# Patient Record
Sex: FEMALE | Race: WHITE | HISPANIC OR LATINO | URBAN - METROPOLITAN AREA
[De-identification: names, ages, dates, MRNs, and addresses within clinical notes are randomized per-mention and may not be internally consistent; named-entity substitution may affect disease eponyms.]

---

## 2019-03-12 ENCOUNTER — OUTPATIENT (OUTPATIENT)
Dept: OUTPATIENT SERVICES | Facility: HOSPITAL | Age: 29
LOS: 1 days | Discharge: HOME | End: 2019-03-12

## 2019-03-12 DIAGNOSIS — K58.1 IRRITABLE BOWEL SYNDROME WITH CONSTIPATION: ICD-10-CM

## 2019-03-12 DIAGNOSIS — L70.9 ACNE, UNSPECIFIED: ICD-10-CM

## 2019-03-12 DIAGNOSIS — M54.5 LOW BACK PAIN: ICD-10-CM

## 2019-03-21 ENCOUNTER — OUTPATIENT (OUTPATIENT)
Dept: OUTPATIENT SERVICES | Facility: HOSPITAL | Age: 29
LOS: 1 days | Discharge: HOME | End: 2019-03-21

## 2019-03-21 DIAGNOSIS — M54.5 LOW BACK PAIN: ICD-10-CM

## 2019-03-21 DIAGNOSIS — K58.1 IRRITABLE BOWEL SYNDROME WITH CONSTIPATION: ICD-10-CM

## 2019-03-21 DIAGNOSIS — L70.9 ACNE, UNSPECIFIED: ICD-10-CM

## 2023-05-17 PROBLEM — Z00.00 ENCOUNTER FOR PREVENTIVE HEALTH EXAMINATION: Status: ACTIVE | Noted: 2023-05-17

## 2023-05-18 ENCOUNTER — APPOINTMENT (OUTPATIENT)
Dept: RHEUMATOLOGY | Facility: CLINIC | Age: 33
End: 2023-05-18
Payer: MEDICAID

## 2023-05-18 VITALS
BODY MASS INDEX: 25.95 KG/M2 | OXYGEN SATURATION: 98 % | HEIGHT: 64 IN | WEIGHT: 152 LBS | SYSTOLIC BLOOD PRESSURE: 116 MMHG | DIASTOLIC BLOOD PRESSURE: 80 MMHG | HEART RATE: 53 BPM

## 2023-05-18 DIAGNOSIS — Z78.9 OTHER SPECIFIED HEALTH STATUS: ICD-10-CM

## 2023-05-18 DIAGNOSIS — R76.8 OTHER SPECIFIED ABNORMAL IMMUNOLOGICAL FINDINGS IN SERUM: ICD-10-CM

## 2023-05-18 PROCEDURE — 99204 OFFICE O/P NEW MOD 45 MIN: CPT

## 2023-05-18 RX ORDER — CYCLOBENZAPRINE HYDROCHLORIDE 5 MG/1
5 TABLET, FILM COATED ORAL 3 TIMES DAILY
Qty: 30 | Refills: 0 | Status: ACTIVE | COMMUNITY
Start: 2023-05-18 | End: 1900-01-01

## 2023-05-18 RX ORDER — ARNICA MONTANA 1 [HP_X]/G
GEL TOPICAL
Qty: 1 | Refills: 0 | Status: ACTIVE | COMMUNITY
Start: 2023-05-18 | End: 1900-01-01

## 2023-05-18 NOTE — HISTORY OF PRESENT ILLNESS
[FreeTextEntry1] : She reports symptoms of fatigue for the past 1 month that lasts all day she is still going to work and the gym but feels more tired. Sleeps 2-3 hours after going home from work at 6-7 pm and then sleeps 6-7 hours in the night. Denies night sweats, weight loss, skin rash, mucositis, photosensitivity although it makes her more tired and irritates her eyes, raynauds, dysphagia, skin thickening, dry mouth, blood clots, miscarriages, serositis, +dry eyes from allergies? + hair loss for the past 6 months\par \par She reports symptoms of L shoulder pain 3 weeks ago. Initially felt this working out at the gym during a bench press. Now her pain is intermittent, daily, worse at the end of the day when she goes home but has this pain throughout the day. Ibuprofen 600 mg, and pain relief spray over the counter helps. During work at [employer] she has no pain. Reports mid neck pain that started at the same time. Denies radiculopathy. When sitting in the same position for a long time she has tingling in her legs and arms. \par \par \par \par \par \par Denies family history of autoimmune disease\par \par \par

## 2023-05-18 NOTE — ASSESSMENT
[FreeTextEntry1] : Positive ELIZABETH and fatigue\par -No clear signs or symptoms to suggest an underlying autoimmune disease\par -check ELIZABETH, dsDNA, Cedillo, RNP, C3, C4, iron, TIBC, folate, B12, B6, vitamin D, TSH, IgG, IgM, IgA, ESR, CRP\par \par Left shoulder pain\par -Suspect due to muscle strain as her symptoms are over her L trapezius muscle and shoulder exam was normal\par -Start cyclobenzaprine 5 mg TID PRN and use arnica get

## 2023-05-18 NOTE — PHYSICAL EXAM
[General Appearance - Alert] : alert [General Appearance - In No Acute Distress] : in no acute distress [Sclera] : the sclera and conjunctiva were normal [PERRL With Normal Accommodation] : pupils were equal in size, round, and reactive to light [Extraocular Movements] : extraocular movements were intact [Outer Ear] : the ears and nose were normal in appearance [Oropharynx] : the oropharynx was normal [Neck Appearance] : the appearance of the neck was normal [Neck Cervical Mass (___cm)] : no neck mass was observed [Jugular Venous Distention Increased] : there was no jugular-venous distention [Thyroid Diffuse Enlargement] : the thyroid was not enlarged [Thyroid Nodule] : there were no palpable thyroid nodules [Auscultation Breath Sounds / Voice Sounds] : lungs were clear to auscultation bilaterally [Heart Rate And Rhythm] : heart rate was normal and rhythm regular [Heart Sounds] : normal S1 and S2 [Heart Sounds Gallop] : no gallops [Murmurs] : no murmurs [Heart Sounds Pericardial Friction Rub] : no pericardial rub [Bowel Sounds] : normal bowel sounds [Abdomen Soft] : soft [Abdomen Tenderness] : non-tender [Abdomen Mass (___ Cm)] : no abdominal mass palpated [Abnormal Walk] : normal gait [Nail Clubbing] : no clubbing  or cyanosis of the fingernails [Musculoskeletal - Swelling] : no joint swelling seen [Motor Tone] : muscle strength and tone were normal [] : no rash [Skin Lesions] : no skin lesions [Affect] : the affect was normal [Mood] : the mood was normal [FreeTextEntry1] : Left trapezius muscle no ttp but tender with L shoulder abduction. Full shoulder exam ROM, valentine, empty can, lift off normal bilaterally

## 2023-06-01 ENCOUNTER — TRANSCRIPTION ENCOUNTER (OUTPATIENT)
Age: 33
End: 2023-06-01

## 2023-06-05 ENCOUNTER — APPOINTMENT (OUTPATIENT)
Dept: RHEUMATOLOGY | Facility: CLINIC | Age: 33
End: 2023-06-05
Payer: MEDICAID

## 2023-06-05 VITALS
BODY MASS INDEX: 25.95 KG/M2 | OXYGEN SATURATION: 98 % | HEART RATE: 60 BPM | DIASTOLIC BLOOD PRESSURE: 80 MMHG | SYSTOLIC BLOOD PRESSURE: 120 MMHG | HEIGHT: 64 IN | WEIGHT: 152 LBS

## 2023-06-05 DIAGNOSIS — M25.562 PAIN IN RIGHT KNEE: ICD-10-CM

## 2023-06-05 DIAGNOSIS — M25.561 PAIN IN RIGHT KNEE: ICD-10-CM

## 2023-06-05 PROCEDURE — 99214 OFFICE O/P EST MOD 30 MIN: CPT

## 2023-06-05 RX ORDER — MELOXICAM 7.5 MG/1
7.5 TABLET ORAL DAILY
Qty: 45 | Refills: 2 | Status: ACTIVE | COMMUNITY
Start: 2023-06-05 | End: 1900-01-01

## 2023-06-05 NOTE — ASSESSMENT
[FreeTextEntry1] : Positive ELIZABETH and fatigue\par -No clear signs or symptoms to suggest an underlying autoimmune disease\par \par Dry eyes\par -Check Sjogrens antibodies. Use systane eye drops\par \par Bilateral knee pain\par -Possible patellofemoral pain syndrome. Check RF, CCP. Start meloxicam 7.5 mg daily PRN\par \par Left shoulder pain\par -Suspect due to muscle strain as her symptoms are over her L trapezius muscle and shoulder exam was normal\par -Continue cyclobenzaprine 5 mg TID PRN and use arnica get

## 2023-06-05 NOTE — HISTORY OF PRESENT ILLNESS
[FreeTextEntry1] : 6/5/23:\par She is here for follow up. L shoulder pain is better. She has 1 week history of bilateral knee pain in the middle of her knee cap present noticed when working at [employer] and also when not working. Symptoms are worse with exercise, nothing makes it better, no swelling  and sometimes has stiffness. She hasn't tried any OTC medications. Also reports hand pain and tightness also started 1 week ago, intermittent, during the days, cold makes it worse, massaging makes it better, no swelling. +Dry eyes and dry mouth\par \par \par Initial visit:\par She reports symptoms of fatigue for the past 1 month that lasts all day she is still going to work and the gym but feels more tired. Sleeps 2-3 hours after going home from work at 6-7 pm and then sleeps 6-7 hours in the night. Denies night sweats, weight loss, skin rash, mucositis, photosensitivity although it makes her more tired and irritates her eyes, raynauds, dysphagia, skin thickening, dry mouth, blood clots, miscarriages, serositis, +dry eyes from allergies? + hair loss for the past 6 months\par \par She reports symptoms of L shoulder pain 3 weeks ago. Initially felt this working out at the gym during a bench press. Now her pain is intermittent, daily, worse at the end of the day when she goes home but has this pain throughout the day. Ibuprofen 600 mg, and pain relief spray over the counter helps. During work at [employer] she has no pain. Reports mid neck pain that started at the same time. Denies radiculopathy. When sitting in the same position for a long time she has tingling in her legs and arms. \par \par \par \par \par \par Denies family history of autoimmune disease\par \par \par

## 2023-06-07 LAB
ENA SCL70 IGG SER IA-ACNC: <0.2 AL
ENA SS-A AB SER IA-ACNC: <0.2 AL
ENA SS-B AB SER IA-ACNC: <0.2 AL
RHEUMATOID FACT SER QL: <10 IU/ML

## 2023-06-08 LAB
CCP AB SER IA-ACNC: <8 UNITS
RF+CCP IGG SER-IMP: NEGATIVE

## 2023-07-10 ENCOUNTER — APPOINTMENT (OUTPATIENT)
Dept: RHEUMATOLOGY | Facility: CLINIC | Age: 33
End: 2023-07-10